# Patient Record
Sex: MALE | Race: WHITE | NOT HISPANIC OR LATINO | Employment: OTHER | URBAN - METROPOLITAN AREA
[De-identification: names, ages, dates, MRNs, and addresses within clinical notes are randomized per-mention and may not be internally consistent; named-entity substitution may affect disease eponyms.]

---

## 2020-09-23 PROBLEM — C90.00 MULTIPLE MYELOMA: Status: ACTIVE | Noted: 2020-09-23

## 2020-09-23 PROBLEM — Z52.011 AUTOLOGOUS DONOR OF STEM CELLS: Status: ACTIVE | Noted: 2020-09-23

## 2020-10-12 PROBLEM — I10 HTN (HYPERTENSION): Status: ACTIVE | Noted: 2020-10-12

## 2020-10-12 PROBLEM — E78.5 HLD (HYPERLIPIDEMIA): Status: ACTIVE | Noted: 2020-10-12

## 2020-10-16 PROBLEM — K59.00 CONSTIPATION: Status: ACTIVE | Noted: 2020-10-16

## 2020-10-19 PROBLEM — R79.9 HEMATOLOGIC ABNORMALITY: Status: ACTIVE | Noted: 2020-10-19

## 2020-10-19 PROBLEM — R19.7 DIARRHEA: Status: ACTIVE | Noted: 2020-10-19

## 2020-10-20 PROBLEM — B99.9 INFECTIOUS DISEASE: Status: ACTIVE | Noted: 2020-10-20
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
## 2020-10-20 PROBLEM — R63.0 DECREASE IN APPETITE: Status: ACTIVE | Noted: 2020-10-20

## 2020-10-20 PROBLEM — E87.6 HYPOKALEMIA: Status: ACTIVE | Noted: 2020-10-20

## 2020-10-25 PROBLEM — K59.00 CONSTIPATION: Status: RESOLVED | Noted: 2020-10-16 | Resolved: 2020-10-25

## 2020-10-25 PROBLEM — R19.7 DIARRHEA: Status: RESOLVED | Noted: 2020-10-19 | Resolved: 2020-10-25

## 2020-12-15 PROBLEM — Z94.81 S/P AUTOLOGOUS BONE MARROW TRANSPLANTATION: Status: ACTIVE | Noted: 2020-12-15

## 2020-12-15 PROBLEM — Z51.81 MEDICATION MONITORING ENCOUNTER: Status: ACTIVE | Noted: 2020-12-15

## 2020-12-15 PROBLEM — Z29.89 NEED FOR PNEUMOCYSTIS PROPHYLAXIS: Status: ACTIVE | Noted: 2020-12-15

## 2021-01-12 PROBLEM — R53.83 FATIGUE: Status: ACTIVE | Noted: 2021-01-12

## 2021-03-05 PROBLEM — Z79.899 MEDICATION MANAGEMENT: Status: ACTIVE | Noted: 2020-12-15

## 2021-04-21 PROBLEM — Z29.89 NEED FOR PNEUMOCYSTIS PROPHYLAXIS: Status: RESOLVED | Noted: 2020-12-15 | Resolved: 2021-04-21

## 2021-04-21 PROBLEM — E87.6 HYPOKALEMIA: Status: RESOLVED | Noted: 2020-10-20 | Resolved: 2021-04-21

## 2021-04-21 PROBLEM — R63.0 DECREASE IN APPETITE: Status: RESOLVED | Noted: 2020-10-20 | Resolved: 2021-04-21

## 2021-04-21 PROBLEM — B99.9 INFECTIOUS DISEASE: Status: RESOLVED | Noted: 2020-10-20 | Resolved: 2021-04-21

## 2021-12-02 ENCOUNTER — PATIENT MESSAGE (OUTPATIENT)
Dept: RESEARCH | Facility: HOSPITAL | Age: 61
End: 2021-12-02

## 2022-01-12 ENCOUNTER — TELEPHONE (OUTPATIENT)
Dept: PULMONOLOGY | Facility: CLINIC | Age: 62
End: 2022-01-12

## 2022-01-12 NOTE — TELEPHONE ENCOUNTER
----- Message from Luc Castano sent at 1/12/2022 12:23 PM CST -----  Contact: Salomon Latif ([facility]) Would like to speak with a nurse regarding pt consent form for for new doctor.  Please contact Salomon @ [phone].  Thanks/As

## 2022-01-12 NOTE — TELEPHONE ENCOUNTER
Spoke to Salomon burnette ([facility]) who was inquiring about pt explained that pt had not seen pulmonary at all he stated he would make a note of it

## 2022-02-09 DIAGNOSIS — D84.9 IMMUNOSUPPRESSED STATUS: ICD-10-CM

## 2023-07-07 ENCOUNTER — PATIENT MESSAGE (OUTPATIENT)
Dept: INFECTIOUS DISEASES | Facility: CLINIC | Age: 63
End: 2023-07-07